# Patient Record
Sex: FEMALE | Race: WHITE | NOT HISPANIC OR LATINO | URBAN - METROPOLITAN AREA
[De-identification: names, ages, dates, MRNs, and addresses within clinical notes are randomized per-mention and may not be internally consistent; named-entity substitution may affect disease eponyms.]

---

## 2023-02-24 ENCOUNTER — OFFICE VISIT (OUTPATIENT)
Dept: URGENT CARE | Facility: PHYSICIAN GROUP | Age: 43
End: 2023-02-24

## 2023-02-24 VITALS
HEART RATE: 87 BPM | WEIGHT: 159 LBS | BODY MASS INDEX: 28.17 KG/M2 | SYSTOLIC BLOOD PRESSURE: 118 MMHG | OXYGEN SATURATION: 98 % | HEIGHT: 63 IN | TEMPERATURE: 98.6 F | DIASTOLIC BLOOD PRESSURE: 76 MMHG

## 2023-02-24 DIAGNOSIS — R42 DIZZINESS: ICD-10-CM

## 2023-02-24 DIAGNOSIS — R11.0 NAUSEA: ICD-10-CM

## 2023-02-24 DIAGNOSIS — R06.02 SHORTNESS OF BREATH: ICD-10-CM

## 2023-02-24 PROCEDURE — 99203 OFFICE O/P NEW LOW 30 MIN: CPT | Performed by: PHYSICIAN ASSISTANT

## 2023-02-24 PROCEDURE — 93000 ELECTROCARDIOGRAM COMPLETE: CPT | Performed by: PHYSICIAN ASSISTANT

## 2023-02-24 ASSESSMENT — ENCOUNTER SYMPTOMS
CONSTIPATION: 0
EYE DISCHARGE: 0
SHORTNESS OF BREATH: 1
DIZZINESS: 1
SORE THROAT: 0
HEADACHES: 0
DIARRHEA: 0
COUGH: 0
EYE PAIN: 0
NAUSEA: 1
ABDOMINAL PAIN: 0
WHEEZING: 0
FEVER: 0
CHILLS: 0
SINUS PAIN: 0
VOMITING: 0
EYE REDNESS: 0
DIAPHORESIS: 0

## 2023-02-24 NOTE — PROGRESS NOTES
"  Subjective:     Nu Gray  is a 42 y.o. female who presents for Nausea (X 2 days), Dizziness, and Shortness of Breath       She presents today with nausea, dizziness and shortness of breath x2 days.  She describes her symptoms as episodic episodes of mild chest discomfort and hyperventilation that can occur throughout the day but are most commonly present when patient is laying down at night sleeping.  They did travel from [place] to [place] 2-3 days ago and they do feel that the symptoms are related to the changes in elevation.  She denies any recent known close sick contacts.  No fevers, no abdominal pain, no diarrhea     Review of Systems   Constitutional:  Negative for chills, diaphoresis, fever and malaise/fatigue.   HENT:  Negative for congestion, ear discharge, sinus pain and sore throat.    Eyes:  Negative for pain, discharge and redness.   Respiratory:  Positive for shortness of breath. Negative for cough and wheezing.    Cardiovascular:  Negative for chest pain.   Gastrointestinal:  Positive for nausea. Negative for abdominal pain, constipation, diarrhea and vomiting.   Genitourinary:  Negative for dysuria, frequency and urgency.   Neurological:  Positive for dizziness. Negative for headaches.    No Known Allergies  History reviewed. No pertinent past medical history.     Objective:   /76 (BP Location: Right arm, Patient Position: Sitting, BP Cuff Size: Small adult)   Pulse 87   Temp 37 °C (98.6 °F) (Temporal)   Ht 1.6 m (5' 3\")   Wt 72.1 kg (159 lb)   SpO2 98%   BMI 28.17 kg/m²   Physical Exam  Vitals and nursing note reviewed.   Constitutional:       General: She is not in acute distress.     Appearance: Normal appearance. She is not ill-appearing, toxic-appearing or diaphoretic.   HENT:      Head: Normocephalic.      Right Ear: Tympanic membrane, ear canal and external ear normal. There is no impacted cerumen.      Left Ear: Tympanic membrane, ear canal and external ear normal. There is " no impacted cerumen.      Nose: No congestion or rhinorrhea.      Mouth/Throat:      Mouth: Mucous membranes are moist.      Pharynx: No oropharyngeal exudate or posterior oropharyngeal erythema.   Eyes:      General:         Right eye: No discharge.         Left eye: No discharge.      Conjunctiva/sclera: Conjunctivae normal.   Cardiovascular:      Rate and Rhythm: Normal rate and regular rhythm.   Pulmonary:      Effort: Pulmonary effort is normal. No respiratory distress.      Breath sounds: Normal breath sounds. No stridor. No wheezing or rhonchi.   Musculoskeletal:      Cervical back: Neck supple.   Lymphadenopathy:      Cervical: No cervical adenopathy.   Neurological:      General: No focal deficit present.      Mental Status: She is alert and oriented to person, place, and time.      Cranial Nerves: No cranial nerve deficit.   Psychiatric:         Mood and Affect: Mood normal.         Behavior: Behavior normal.         Thought Content: Thought content normal.         Judgment: Judgment normal.           Diagnostic testing:     Ekg  12- Lead EKG; interpreted by ED Physician   Normal sinus rhythm with a rate of 78 bpm.   Normal axis.  Normal intervals.   No ST elevation or depression    No widening of QRS complex   Good R wave progression   No diagnostic Q waves.   No previous EKG for comparison.    Clinical Impression: Sinus rhythm, no evidence of acute ischemia or arrhythmia on exam today      Assessment/Plan:     Encounter Diagnoses   Name Primary?    Shortness of breath     Dizziness     Nausea           Plan for care for today's complaint includes watchful waiting technique at this time.  Based on physical exam there is not evidence to support cardiopulmonary pathology, cranial nerve exam was normal today.  Her symptoms are episodic, I do have suspicion for possible paroxysmal PACs/PVCs or other cardiac arrhythmias; we will have the patient follow-up with primary care provider in [place] once they  return home for further evaluation and management.  I did discuss with the patient the cost benefit profile of performing a PCR COVID/influenza/RSV test today, I do not feel that the out-of-pocket price for a noninsured patient justifies the limited benefit that this test would provide us.  I did educate the patient that she should perform an over-the-counter medication as it would be cheaper out-of-pocket for her to perform this over-the-counter test to evaluate for COVID-19.  If we were to test with the PCR testing today I do not feel that the results of this test would change my treatment plan.  Continue with over-the-counter medications for symptom support.  Continue to monitor symptoms and return to urgent care or follow-up with primary care provider if symptoms remain ongoing.  Follow-up in the emergency department if symptoms become severe, ER precautions discussed in office today..    See AVS Instructions below for written guidance provided to patient on after-visit management and care in addition to our verbal discussion during the visit.    Please note that this dictation was created using voice recognition software. I have attempted to correct all errors, but there may be sound-alike, spelling, grammar and possibly content errors that I did not discover before finalizing the note.    Moi Ruff PA-C